# Patient Record
Sex: FEMALE | Race: WHITE | NOT HISPANIC OR LATINO | URBAN - METROPOLITAN AREA
[De-identification: names, ages, dates, MRNs, and addresses within clinical notes are randomized per-mention and may not be internally consistent; named-entity substitution may affect disease eponyms.]

---

## 2019-03-17 ENCOUNTER — EMERGENCY (EMERGENCY)
Facility: HOSPITAL | Age: 49
LOS: 0 days | Discharge: HOME | End: 2019-03-17
Attending: EMERGENCY MEDICINE | Admitting: EMERGENCY MEDICINE
Payer: MEDICAID

## 2019-03-17 VITALS
HEART RATE: 87 BPM | RESPIRATION RATE: 15 BRPM | TEMPERATURE: 98 F | SYSTOLIC BLOOD PRESSURE: 145 MMHG | OXYGEN SATURATION: 100 % | DIASTOLIC BLOOD PRESSURE: 97 MMHG | WEIGHT: 235.01 LBS | HEIGHT: 68 IN

## 2019-03-17 VITALS
HEART RATE: 85 BPM | TEMPERATURE: 98 F | SYSTOLIC BLOOD PRESSURE: 132 MMHG | OXYGEN SATURATION: 100 % | DIASTOLIC BLOOD PRESSURE: 63 MMHG | RESPIRATION RATE: 18 BRPM

## 2019-03-17 DIAGNOSIS — Z88.0 ALLERGY STATUS TO PENICILLIN: ICD-10-CM

## 2019-03-17 DIAGNOSIS — R07.9 CHEST PAIN, UNSPECIFIED: ICD-10-CM

## 2019-03-17 DIAGNOSIS — M79.622 PAIN IN LEFT UPPER ARM: ICD-10-CM

## 2019-03-17 DIAGNOSIS — R07.89 OTHER CHEST PAIN: ICD-10-CM

## 2019-03-17 DIAGNOSIS — Z88.2 ALLERGY STATUS TO SULFONAMIDES: ICD-10-CM

## 2019-03-17 DIAGNOSIS — I10 ESSENTIAL (PRIMARY) HYPERTENSION: ICD-10-CM

## 2019-03-17 LAB
ALBUMIN SERPL ELPH-MCNC: 3.4 G/DL — SIGNIFICANT CHANGE UP (ref 3.3–5)
ALP SERPL-CCNC: 65 U/L — SIGNIFICANT CHANGE UP (ref 40–120)
ALT FLD-CCNC: 21 U/L — SIGNIFICANT CHANGE UP (ref 12–78)
ANION GAP SERPL CALC-SCNC: 12 MMOL/L — SIGNIFICANT CHANGE UP (ref 5–17)
APPEARANCE UR: CLEAR — SIGNIFICANT CHANGE UP
APTT BLD: 33.2 SEC — SIGNIFICANT CHANGE UP (ref 27.5–36.3)
AST SERPL-CCNC: 14 U/L — LOW (ref 15–37)
BACTERIA # UR AUTO: ABNORMAL
BASOPHILS # BLD AUTO: 0.05 K/UL — SIGNIFICANT CHANGE UP (ref 0–0.2)
BASOPHILS NFR BLD AUTO: 0.5 % — SIGNIFICANT CHANGE UP (ref 0–2)
BILIRUB SERPL-MCNC: 0.2 MG/DL — SIGNIFICANT CHANGE UP (ref 0.2–1.2)
BILIRUB UR-MCNC: NEGATIVE — SIGNIFICANT CHANGE UP
BUN SERPL-MCNC: 9 MG/DL — SIGNIFICANT CHANGE UP (ref 7–23)
CALCIUM SERPL-MCNC: 8.5 MG/DL — SIGNIFICANT CHANGE UP (ref 8.5–10.1)
CHLORIDE SERPL-SCNC: 108 MMOL/L — SIGNIFICANT CHANGE UP (ref 96–108)
CO2 SERPL-SCNC: 23 MMOL/L — SIGNIFICANT CHANGE UP (ref 22–31)
COLOR SPEC: YELLOW — SIGNIFICANT CHANGE UP
CREAT SERPL-MCNC: 0.73 MG/DL — SIGNIFICANT CHANGE UP (ref 0.5–1.3)
D DIMER BLD IA.RAPID-MCNC: 164 NG/ML DDU — SIGNIFICANT CHANGE UP
DIFF PNL FLD: ABNORMAL
EOSINOPHIL # BLD AUTO: 0.21 K/UL — SIGNIFICANT CHANGE UP (ref 0–0.5)
EOSINOPHIL NFR BLD AUTO: 1.9 % — SIGNIFICANT CHANGE UP (ref 0–6)
EPI CELLS # UR: ABNORMAL
GLUCOSE SERPL-MCNC: 89 MG/DL — SIGNIFICANT CHANGE UP (ref 70–99)
GLUCOSE UR QL: NEGATIVE MG/DL — SIGNIFICANT CHANGE UP
HCT VFR BLD CALC: 38.8 % — SIGNIFICANT CHANGE UP (ref 34.5–45)
HGB BLD-MCNC: 11.9 G/DL — SIGNIFICANT CHANGE UP (ref 11.5–15.5)
IMM GRANULOCYTES NFR BLD AUTO: 0.3 % — SIGNIFICANT CHANGE UP (ref 0–1.5)
INR BLD: 1.05 RATIO — SIGNIFICANT CHANGE UP (ref 0.88–1.16)
KETONES UR-MCNC: NEGATIVE — SIGNIFICANT CHANGE UP
LEUKOCYTE ESTERASE UR-ACNC: NEGATIVE — SIGNIFICANT CHANGE UP
LYMPHOCYTES # BLD AUTO: 28.6 % — SIGNIFICANT CHANGE UP (ref 13–44)
LYMPHOCYTES # BLD AUTO: 3.12 K/UL — SIGNIFICANT CHANGE UP (ref 1–3.3)
MAGNESIUM SERPL-MCNC: 2 MG/DL — SIGNIFICANT CHANGE UP (ref 1.6–2.6)
MCHC RBC-ENTMCNC: 23 PG — LOW (ref 27–34)
MCHC RBC-ENTMCNC: 30.7 GM/DL — LOW (ref 32–36)
MCV RBC AUTO: 74.9 FL — LOW (ref 80–100)
MONOCYTES # BLD AUTO: 0.62 K/UL — SIGNIFICANT CHANGE UP (ref 0–0.9)
MONOCYTES NFR BLD AUTO: 5.7 % — SIGNIFICANT CHANGE UP (ref 2–14)
NEUTROPHILS # BLD AUTO: 6.89 K/UL — SIGNIFICANT CHANGE UP (ref 1.8–7.4)
NEUTROPHILS NFR BLD AUTO: 63 % — SIGNIFICANT CHANGE UP (ref 43–77)
NITRITE UR-MCNC: NEGATIVE — SIGNIFICANT CHANGE UP
NRBC # BLD: 0 /100 WBCS — SIGNIFICANT CHANGE UP (ref 0–0)
NT-PROBNP SERPL-SCNC: 39 PG/ML — SIGNIFICANT CHANGE UP (ref 0–125)
PH UR: 7 — SIGNIFICANT CHANGE UP (ref 5–8)
PLATELET # BLD AUTO: 439 K/UL — HIGH (ref 150–400)
POTASSIUM SERPL-MCNC: 3.5 MMOL/L — SIGNIFICANT CHANGE UP (ref 3.5–5.3)
POTASSIUM SERPL-SCNC: 3.5 MMOL/L — SIGNIFICANT CHANGE UP (ref 3.5–5.3)
PROT SERPL-MCNC: 7.8 GM/DL — SIGNIFICANT CHANGE UP (ref 6–8.3)
PROT UR-MCNC: NEGATIVE MG/DL — SIGNIFICANT CHANGE UP
PROTHROM AB SERPL-ACNC: 11.7 SEC — SIGNIFICANT CHANGE UP (ref 10–12.9)
RBC # BLD: 5.18 M/UL — SIGNIFICANT CHANGE UP (ref 3.8–5.2)
RBC # FLD: 17.7 % — HIGH (ref 10.3–14.5)
RBC CASTS # UR COMP ASSIST: ABNORMAL /HPF (ref 0–4)
SODIUM SERPL-SCNC: 143 MMOL/L — SIGNIFICANT CHANGE UP (ref 135–145)
SP GR SPEC: 1 — LOW (ref 1.01–1.02)
TROPONIN I SERPL-MCNC: <0.015 NG/ML — SIGNIFICANT CHANGE UP (ref 0.01–0.04)
TROPONIN I SERPL-MCNC: <0.015 NG/ML — SIGNIFICANT CHANGE UP (ref 0.01–0.04)
UROBILINOGEN FLD QL: NEGATIVE MG/DL — SIGNIFICANT CHANGE UP
WBC # BLD: 10.92 K/UL — HIGH (ref 3.8–10.5)
WBC # FLD AUTO: 10.92 K/UL — HIGH (ref 3.8–10.5)
WBC UR QL: SIGNIFICANT CHANGE UP

## 2019-03-17 PROCEDURE — 93010 ELECTROCARDIOGRAM REPORT: CPT

## 2019-03-17 PROCEDURE — 99285 EMERGENCY DEPT VISIT HI MDM: CPT | Mod: 25

## 2019-03-17 PROCEDURE — 71045 X-RAY EXAM CHEST 1 VIEW: CPT | Mod: 26

## 2019-03-17 NOTE — ED PROVIDER NOTE - CARE PROVIDER_API CALL
Lupe Treviño)  Internal Medicine  [address] [place] [address]  Phone: [phone]  Fax: [phone]  Follow Up Time:     Viraj Pascual (DO)  Cardiology; Internal Medicine  [address] [place] [address]  Phone: [phone]  Fax: [phone]  Follow Up Time:     Marie Robles)  Internal Medicine  [address] [place] [address] [address] [place] [address]  Phone: [phone]  Fax: [phone]  Follow Up Time:

## 2019-03-17 NOTE — ED PROVIDER NOTE - CLINICAL SUMMARY MEDICAL DECISION MAKING FREE TEXT BOX
LUE pain/numbness, more likely radiculopathy, but with occasional chest pressure and h/o HTN, will check labs, including trop x 2; ekg wnl.

## 2019-03-17 NOTE — ED PROVIDER NOTE - MUSCULOSKELETAL, MLM
Spine appears normal, range of motion is not limited, Diffuse tenderness across upper back/neck without point tenderness, FROM

## 2019-03-17 NOTE — ED PROVIDER NOTE - PROVIDER TOKENS
PROVIDER:[TOKEN:[[id number]]],PROVIDER:[TOKEN:[[id number]]],PROVIDER:[TOKEN:[[id number]]]

## 2019-03-17 NOTE — ED PROVIDER NOTE - NSFOLLOWUPINSTRUCTIONS_ED_ALL_ED_FT
Follow up with your doctor--see suggestions for Primary Medical Doctors locally  Follow up with cardiology  Return to ED for any further concerns or worsening symptoms    Chest Pain    Chest pain can be caused by many different conditions which may or may not be dangerous. Causes include heartburn, lung infections, heart attack, blood clot in lungs, skin infections, strain or damage to muscle, cartilage, or bones, etc. In addition to a history and physical examination, an electrocardiogram (ECG) or other lab tests may have been performed to determine the cause of your chest pain. Follow up with your primary care provider or with a cardiologist as instructed.     SEEK IMMEDIATE MEDICAL CARE IF YOU HAVE ANY OF THE FOLLOWING SYMPTOMS: worsening chest pain, coughing up blood, unexplained back/neck/jaw pain, severe abdominal pain, dizziness or lightheadedness, fainting, shortness of breath, sweaty or clammy skin, vomiting, or racing heart beat. These symptoms may represent a serious problem that is an emergency. Do not wait to see if the symptoms will go away. Get medical help right away. Call 911 and do not drive yourself to the hospital.

## 2019-03-17 NOTE — ED ADULT NURSE NOTE - OBJECTIVE STATEMENT
BIBA c/o chest pain with numbness & tingling radiating to left UE worsening tonight but intermittent for a few days. No distress noted. Able to speak in full sentences.  Denies SOB. Denies N/V. Reports she came almost hear her heart beating in left ear. Was tx from recent Ear infection & completed course of ABX BIBA c/o chest pain with numbness & tingling radiating to left UE worsening tonight but intermittent for a few days. No distress noted. Able to speak in full sentences.  Denies SOB. Denies N/V. Reports she can almost hear her heart beating in left ear. Was tx from recent ear infection & completed course of ABX. Denies heavy lifting/pulling

## 2019-03-17 NOTE — ED PROVIDER NOTE - OBJECTIVE STATEMENT
49 yo female with HTN , on amlodipine and HCTZ c/o left arm pain.  Patient c/o 2-3 days of intermittent heaviness and pain in the LUE and left upper back/neck with numbness in the hand.  Symptoms were improving with holding the arm above her head.  Today the symptoms have been constant and not relieved with position change.  Took no medication.  Patient complains of occasionally feeling a heaviness in the center of the chest.  Also c/o feeling like her heart is pounding in her ear at times.  Nonsmoker, nondrinker.  Pt lives in [place], but has been staying in [place] with her mother who now needs full time care.  +increased stressor.

## 2019-09-30 ENCOUNTER — EMERGENCY (EMERGENCY)
Facility: HOSPITAL | Age: 49
LOS: 0 days | Discharge: HOME | End: 2019-09-30
Attending: EMERGENCY MEDICINE
Payer: MEDICAID

## 2019-09-30 VITALS
TEMPERATURE: 98 F | SYSTOLIC BLOOD PRESSURE: 112 MMHG | DIASTOLIC BLOOD PRESSURE: 82 MMHG | RESPIRATION RATE: 17 BRPM | HEART RATE: 78 BPM | OXYGEN SATURATION: 100 %

## 2019-09-30 VITALS — WEIGHT: 199.96 LBS | HEIGHT: 67 IN

## 2019-09-30 DIAGNOSIS — Z88.0 ALLERGY STATUS TO PENICILLIN: ICD-10-CM

## 2019-09-30 DIAGNOSIS — Z88.2 ALLERGY STATUS TO SULFONAMIDES: ICD-10-CM

## 2019-09-30 DIAGNOSIS — M25.562 PAIN IN LEFT KNEE: ICD-10-CM
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
## 2019-09-30 DIAGNOSIS — I10 ESSENTIAL (PRIMARY) HYPERTENSION: ICD-10-CM
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
## 2019-09-30 PROCEDURE — 93010 ELECTROCARDIOGRAM REPORT: CPT

## 2019-09-30 PROCEDURE — 99283 EMERGENCY DEPT VISIT LOW MDM: CPT | Mod: 25

## 2019-09-30 PROCEDURE — 99283 EMERGENCY DEPT VISIT LOW MDM: CPT

## 2019-09-30 PROCEDURE — 73564 X-RAY EXAM KNEE 4 OR MORE: CPT | Mod: 26,LT

## 2019-09-30 PROCEDURE — 93005 ELECTROCARDIOGRAM TRACING: CPT

## 2019-09-30 PROCEDURE — 73564 X-RAY EXAM KNEE 4 OR MORE: CPT | Mod: LT

## 2019-09-30 RX ORDER — PANTOPRAZOLE SODIUM 20 MG/1
1 TABLET, DELAYED RELEASE ORAL
Qty: 20 | Refills: 0
Start: 2019-09-30

## 2019-09-30 NOTE — ED ADULT TRIAGE NOTE - CHIEF COMPLAINT QUOTE
Patient presents with left knee pain and stiffness for the past two months states she started a new workout and thought she may have overworked herself so she stopped working out but pain persisted, patient also reports left shoulder and arm tightness for the past 3 days.

## 2019-09-30 NOTE — ED STATDOCS - PATIENT PORTAL LINK FT
You can access the FollowMyHealth Patient Portal offered by [facility] by registering at the following website: [email or web address]. By joining [facility]’s FollowMyHealth portal, you will also be able to view your health information using other applications (apps) compatible with our system.

## 2019-09-30 NOTE — ED STATDOCS - OBJECTIVE STATEMENT
50 y/o female with a PMHx of HTN presents to the ED c/o left knee pain. Pt was working out 2 months ago and did leg workouts everyday for two weeks and saw her legs were swollen and her left knee was in pain. Pt stopped working out to rest her legs. Pt states she wasn't able to walk because of the pain but when she stopped taking the amlodipine which she had been taking for a while and found her swelling went down a little. Pt hasn't seen an orthopedist. Pt was taking Motrin and Tylenol that didn't seem like it helped. PCP: Dr. Flakito Loco

## 2019-09-30 NOTE — ED STATDOCS - PHYSICAL EXAMINATION
GEN: AOX3, NAD. HEENT: Throat clear. Airway is patent. EYES: PERRLA. EOMI. Head: NC/AT. NECK: Supple, No JVD. FROM. C-spine non-tender. CV:S1S2, RRR, LUNGS: CTA b/l, no w/r/r. CHEST: Equal chest expansion and rise. No deformity. ABD: Soft, NT/ND, no rebound, no guarding. No CVAT. EXT: No e/c/c. 2+ distal pulses. SKIN: No rashes. LEFT KNEE: +Mild STS left knee, ?slight joint effusion. +Tenderness left lateral knee over LCL area. No true ligament laxity. Able to straight raise LLE without difficulty. No calf tenderness. NEG Gwendolyn. NEG Wick. 2+ distal pulses. NVI. Able to fully bear weight. NEURO: No focal deficits. CN II-XII intact. FROM. 5/5 motor and sensory. ADAMARIS Gannon

## 2019-09-30 NOTE — ED ADULT NURSE NOTE - OBJECTIVE STATEMENT
Patient presents to ED complaining of knee pain. Patient complaining of L knee pain and stiffness x 2 months. Patient states she began new leg workouts and noticed swelling and pain. Patient states she stopped workouts but is still experiencing stiffness and pain mainly in L knee. Patient ambulatory on arrival. Patient states she has been taking tylenol and motrin with minimal relief.

## 2019-09-30 NOTE — ED STATDOCS - MUSCULOSKELETAL, MLM
range of motion is not limited and there is no muscle tenderness. +left supra patella edema without erythema or warmth, full ROM, no lacticity

## 2019-09-30 NOTE — ED STATDOCS - CARE PROVIDER_API CALL
Po Randhawa)  Orthopaedic Surgery  [address], [address]  [place] [address]  Phone: [phone]  Fax: [phone]  Follow Up Time:

## 2019-09-30 NOTE — ED ADULT NURSE NOTE - NSIMPLEMENTINTERV_GEN_ALL_ED
Implemented All Universal Safety Interventions:  [place] to call system. Call bell, personal items and telephone within reach. Instruct patient to call for assistance. Room bathroom lighting operational. Non-slip footwear when patient is off stretcher. Physically safe environment: no spills, clutter or unnecessary equipment. Stretcher in lowest position, wheels locked, appropriate side rails in place.

## 2019-09-30 NOTE — ED STATDOCS - PROGRESS NOTE DETAILS
Patient presented with left knee pain, likely has LCL injury or IT band syndrome. Patient re-examined and re-evaluated. Patient feels better at this time. ED evaluation, Diagnosis and management discussed with the patient in detail. Workup results discussed with KIRAN Pretty to dc home with Naproxen.  Close ORTHO follow up/MRI left knee encouraged.  Strict ED return instructions discussed in detail and patient given the opportunity to ask any questions about their discharge diagnosis and instructions. Patient verbalized understanding. ~ ADAMARIS Gannon

## 2020-02-26 ENCOUNTER — TRANSCRIPTION ENCOUNTER (OUTPATIENT)
Age: 50
End: 2020-02-26

## 2020-03-04 NOTE — ED ADULT NURSE NOTE - NS ED NURSE RECORD ANOTHER HT AND WT
Yes
Patient initially seen and evaluated with ED attending at intake.  Nontoxic appearing, happy, alert.  Flu test is negative.  REviewed symptom management at home and follow up at the pediatrician's office -Ranjit Botello PA-C

## 2020-03-10 PROBLEM — Z00.00 ENCOUNTER FOR PREVENTIVE HEALTH EXAMINATION: Status: ACTIVE | Noted: 2020-03-10

## 2020-03-11 ENCOUNTER — APPOINTMENT (OUTPATIENT)
Dept: OBGYN | Facility: CLINIC | Age: 50
End: 2020-03-11
Payer: MEDICAID

## 2020-03-11 VITALS — SYSTOLIC BLOOD PRESSURE: 110 MMHG | DIASTOLIC BLOOD PRESSURE: 70 MMHG

## 2020-03-11 DIAGNOSIS — N95.1 MENOPAUSAL AND FEMALE CLIMACTERIC STATES: ICD-10-CM

## 2020-03-11 DIAGNOSIS — N92.6 IRREGULAR MENSTRUATION, UNSPECIFIED: ICD-10-CM

## 2020-03-11 LAB
BILIRUB UR QL STRIP: NORMAL
CLARITY UR: CLEAR
COLLECTION METHOD: NORMAL
GLUCOSE UR-MCNC: NORMAL
HCG UR QL: 0.2 EU/DL
HGB UR QL STRIP.AUTO: NORMAL
KETONES UR-MCNC: NORMAL
LEUKOCYTE ESTERASE UR QL STRIP: NORMAL
NITRITE UR QL STRIP: NORMAL
PH UR STRIP: 6
PROT UR STRIP-MCNC: NORMAL
SP GR UR STRIP: 1.02

## 2020-03-11 PROCEDURE — 99204 OFFICE O/P NEW MOD 45 MIN: CPT

## 2020-03-11 PROCEDURE — 81003 URINALYSIS AUTO W/O SCOPE: CPT | Mod: QW

## 2020-03-11 NOTE — CHIEF COMPLAINT
[Initial Visit] : initial GYN visit [FreeTextEntry1] : Patient is a 49-year-old female presents for consultation to discuss previous test results and possible treatment options. Patient has a known fibroid uterus. Patient underwent a workup and evaluation in April of 2019. This included pelvic ultrasound and pelvic MRI as well small lab work. Model lab work was within normal limits pelvic M. MRI and pelvic sonogram revealed an enlarged fibroid uterus with multiple fibroids the largest of which is 6.1 cm and a 5.7 cm submucosal myoma as well and numerous additional smaller fibroids. Patient states that she was having heavy uterine bleeding at that time. Patient states that her periods have become somewhat irregular and has not been heavy a little S4 to 6 months. Patient did have an endometrial biopsy at that time which revealed benign endometrium. Discussed these results and patient's current symptoms. Advised followup pelvic ultrasound at home lab evaluation the patient also advised to schedule routine annual exam and Pap smear. Further treatment discussion will be held after the results are available. Patient states that she understands her questions had been answered and agrees with the recommended course of management.Patient also with complaints of mild occasional urinary stress incontinence. This was discussed with the patient in detail. Advised kegel exercises and weight loss.\par \par 45 minutes of face time

## 2020-03-11 NOTE — PHYSICAL EXAM
[Normal] : uterus [No Bleeding] : there was no active vaginal bleeding [Enlarged ___ wks] : enlarged [unfilled] ~Uweeks [Uterine Adnexae] : were not tender and not enlarged [FreeTextEntry7] : 2-14 week size irregular fibroid uterus noted

## 2020-03-16 ENCOUNTER — OUTPATIENT (OUTPATIENT)
Dept: OUTPATIENT SERVICES | Facility: HOSPITAL | Age: 50
LOS: 1 days | End: 2020-03-16
Payer: MEDICAID

## 2020-03-16 ENCOUNTER — APPOINTMENT (OUTPATIENT)
Dept: ULTRASOUND IMAGING | Facility: CLINIC | Age: 50
End: 2020-03-16
Payer: MEDICAID

## 2020-03-16 ENCOUNTER — RESULT REVIEW (OUTPATIENT)
Age: 50
End: 2020-03-16

## 2020-03-16 DIAGNOSIS — Z00.8 ENCOUNTER FOR OTHER GENERAL EXAMINATION: ICD-10-CM

## 2020-03-16 PROCEDURE — 76830 TRANSVAGINAL US NON-OB: CPT

## 2020-03-16 PROCEDURE — 76856 US EXAM PELVIC COMPLETE: CPT

## 2020-03-16 PROCEDURE — 76856 US EXAM PELVIC COMPLETE: CPT | Mod: 26

## 2020-03-16 PROCEDURE — 76830 TRANSVAGINAL US NON-OB: CPT | Mod: 26

## 2020-03-17 RX ORDER — HYDROCHLOROTHIAZIDE 12.5 MG/1
12.5 TABLET ORAL
Refills: 0 | Status: ACTIVE | COMMUNITY

## 2020-03-18 LAB
BASOPHILS # BLD AUTO: 0.07 K/UL
BASOPHILS NFR BLD AUTO: 0.8 %
EOSINOPHIL # BLD AUTO: 0.21 K/UL
EOSINOPHIL NFR BLD AUTO: 2.4 %
ESTRADIOL SERPL-MCNC: 94 PG/ML
FSH SERPL-MCNC: 25.2 IU/L
HCG SERPL-MCNC: <1 MIU/ML
HCT VFR BLD CALC: 42.5 %
HGB BLD-MCNC: 12.6 G/DL
IMM GRANULOCYTES NFR BLD AUTO: 0.3 %
LYMPHOCYTES # BLD AUTO: 2.63 K/UL
LYMPHOCYTES NFR BLD AUTO: 29.8 %
MAN DIFF?: NORMAL
MCHC RBC-ENTMCNC: 23.2 PG
MCHC RBC-ENTMCNC: 29.6 GM/DL
MCV RBC AUTO: 78.3 FL
MONOCYTES # BLD AUTO: 0.67 K/UL
MONOCYTES NFR BLD AUTO: 7.6 %
NEUTROPHILS # BLD AUTO: 5.21 K/UL
NEUTROPHILS NFR BLD AUTO: 59.1 %
PLATELET # BLD AUTO: 432 K/UL
RBC # BLD: 5.43 M/UL
RBC # FLD: 15.8 %
TSH SERPL-ACNC: 1.77 UIU/ML
WBC # FLD AUTO: 8.82 K/UL

## 2020-03-21 ENCOUNTER — TRANSCRIPTION ENCOUNTER (OUTPATIENT)
Age: 50
End: 2020-03-21

## 2020-04-01 ENCOUNTER — APPOINTMENT (OUTPATIENT)
Dept: GASTROENTEROLOGY | Facility: CLINIC | Age: 50
End: 2020-04-01

## 2020-04-14 ENCOUNTER — TRANSCRIPTION ENCOUNTER (OUTPATIENT)
Age: 50
End: 2020-04-14

## 2020-04-15 ENCOUNTER — TRANSCRIPTION ENCOUNTER (OUTPATIENT)
Age: 50
End: 2020-04-15

## 2020-04-16 ENCOUNTER — TRANSCRIPTION ENCOUNTER (OUTPATIENT)
Age: 50
End: 2020-04-16

## 2020-04-20 ENCOUNTER — APPOINTMENT (OUTPATIENT)
Dept: NEUROLOGY | Facility: CLINIC | Age: 50
End: 2020-04-20
Payer: MEDICAID

## 2020-04-20 VITALS — WEIGHT: 225 LBS | HEIGHT: 67.5 IN | BODY MASS INDEX: 34.9 KG/M2

## 2020-04-20 DIAGNOSIS — Z78.9 OTHER SPECIFIED HEALTH STATUS: ICD-10-CM

## 2020-04-20 DIAGNOSIS — Z82.49 FAMILY HISTORY OF ISCHEMIC HEART DISEASE AND OTHER DISEASES OF THE CIRCULATORY SYSTEM: ICD-10-CM

## 2020-04-20 DIAGNOSIS — Z81.8 FAMILY HISTORY OF OTHER MENTAL AND BEHAVIORAL DISORDERS: ICD-10-CM

## 2020-04-20 DIAGNOSIS — Z87.59 PERSONAL HISTORY OF OTHER COMPLICATIONS OF PREGNANCY, CHILDBIRTH AND THE PUERPERIUM: ICD-10-CM

## 2020-04-20 DIAGNOSIS — I10 ESSENTIAL (PRIMARY) HYPERTENSION: ICD-10-CM

## 2020-04-20 DIAGNOSIS — M54.2 CERVICALGIA: ICD-10-CM

## 2020-04-20 DIAGNOSIS — R51 HEADACHE: ICD-10-CM

## 2020-04-20 PROCEDURE — 99204 OFFICE O/P NEW MOD 45 MIN: CPT | Mod: 95

## 2020-04-20 RX ORDER — CYCLOBENZAPRINE HYDROCHLORIDE 10 MG/1
10 TABLET, FILM COATED ORAL
Qty: 30 | Refills: 1 | Status: ACTIVE | COMMUNITY
Start: 2020-04-20 | End: 1900-01-01

## 2020-04-20 NOTE — CONSULT LETTER
[Dear  ___] : Dear ~SKIP, [Consult Closing:] : Thank you very much for allowing me to participate in the care of this patient.  If you have any questions, please do not hesitate to contact me. [Please see my note below.] : Please see my note below. [Consult Letter:] : I had the pleasure of evaluating your patient, [unfilled]. [FreeTextEntry2] : Danielle Kaplan [FreeTextEntry3] : Sincerely,\par \par \par Hodan Cunningham MD\par Diplomate, American Academy of Psychiatry and Neurology\par Board Certified in the Subspecialty of Clinical Neurophysiology\par Board Certified in the Subspecialty of Sleep Medicine\par Board Certified in the Subspecialty of Epilepsy\par

## 2020-04-20 NOTE — DISCUSSION/SUMMARY
[FreeTextEntry1] : Ms. Foster is a 49 year old woman with daily headaches for two weeks.\par The pain is severe and is focused on the right sided and is associated with neck pain.\par She has some features of both migraine and tension headache.\par She is perimenopausal and hormonal issues may also be a contributing factor. \par \par Limited exam via televisit is unremarkable.\par \par -Will check MRI brain with and without contrast to r/o any significant pathology given unilateral nature of headache. She would like to have this done at a stand up facility.\par -f/u COVID-19 swab results. I told her that if she is positive for covid-19, we may have to delay MRI.\par -Trial of cyclobenzaprine 10 mg qhs for 1 week and then qhs as needed to help relieve muscle tension that is likely contributing to headache.\par -If headache persists and she is covid negative, we can consider a short course of steroids to break headache. \par -I am holding off on a daily preventative medication since these may take weeks to work and may be somewhat premature at this time. However, if headaches persist, can also try a tricyclic antidepressant or Topamax.\par \par Will f/u with patient after MRI and with either virtual or in person visit in about one month. \par \par

## 2020-04-20 NOTE — HISTORY OF PRESENT ILLNESS
[Medical Office: ([facility])___] : at the medical office located in  [Patient] : the patient [Home] : at home, [unfilled] , at the time of the visit. [Self] : self [FreeTextEntry1] : \par This is a telehealth visit that was performed with the originating site at the patient’s home and the distant site of my office at [address] [place] [address].\par Two way audio and visual technology was used. \par Verbal consent to participate in the telephone/video visit was obtained in lieu of in-person signature due to the coronavirus emergency.\par This particular visit occurred during the 2020 COVID-19 emergency. I discussed with the patient the nature of our telehealth visits, that:\par -I would evaluate the patient and recommend diagnostics and treatments based on my assessment.\par -Our sessions are not being recorded.\par -The patient acknowledged the risk of unsecure transmission of his/her information.\par -Our team would provide follow up care in person if/when the patient needs it.\par \par She was referred to neurology by urgent care. \par She went to urgent care for headaches.\par She has been experiencing headaches for about two weeks. She does not typically suffer from headaches.\par The headaches are located on the right side of her head. \par She has tenderness to the touch over the right frontal and right parietal area. There is sometimes radiation to the right temple. At times it feels like she has an ear infection. At other times there is pain in her jaw.\par She also has pain in neck.\par The pain comes and goes. Yesterday was a good day. However, it returned today.\par She describes the pain as a throbbing and burning sensation.\par She also has some soreness on the left.\par She has associated dizziness/disequilibrium. She also has photophobia, predominantly in the right eye and some phonophobia. The headache is not associated with nausea.\par \par She has tried taking Tylenol without relief. \par At urgent care she was given a Toradol injection. It did not provide relief. It caused nausea.\par \par She denies having any fever. \par She denies having any focal weakness. \par She feels very uncomfortable and is having difficulty focusing.\par \par She says that she was previously prescribed antidepressants so she tried to take it again for two days but it did not provide relief. \par She did recently have pink eye for which she was prescribed antibiotics but otherwise she has not had any recent illnesses.\par \par She had a swab for covid-19 on 4/18 and is waiting for the results.

## 2020-04-20 NOTE — REVIEW OF SYSTEMS
[Eyesight Problems] : eyesight problems [Lower Ext Edema] : lower extremity edema [As Noted in HPI] : as noted in HPI [Cough] : cough [Negative] : Gastrointestinal [de-identified] : stress [de-identified] : headache\par neck pain [FreeTextEntry9] : neck pain [FreeTextEntry8] : irregular menstrual cycle [FreeTextEntry4] : earache

## 2020-04-20 NOTE — PHYSICAL EXAM
[FreeTextEntry1] : Examination:\par Patient is well appearing\par Neurological Examination:\par Mental Status: Alert, oriented to person, place and time\par Language: No aphasia\par Cranial Nerves:\par PERRL, EOMI, No facial weakness, tongue protrudes in the midline\par Motor Exam: No pronator drift. Barrel roll intact. Fine motor movements intact in hands\par Able to stand up from chair without difficulty\par Sensory: intact to self exam\par Reflexes: Unable to examine\par Cerebellar: Finger to nose intact bilaterally\par \par \par

## 2020-04-23 ENCOUNTER — APPOINTMENT (OUTPATIENT)
Dept: MRI IMAGING | Facility: CLINIC | Age: 50
End: 2020-04-23
Payer: MEDICAID

## 2020-04-23 ENCOUNTER — OUTPATIENT (OUTPATIENT)
Dept: OUTPATIENT SERVICES | Facility: HOSPITAL | Age: 50
LOS: 1 days | End: 2020-04-23
Payer: MEDICAID

## 2020-04-23 ENCOUNTER — RESULT REVIEW (OUTPATIENT)
Age: 50
End: 2020-04-23

## 2020-04-23 ENCOUNTER — TRANSCRIPTION ENCOUNTER (OUTPATIENT)
Age: 50
End: 2020-04-23

## 2020-04-23 DIAGNOSIS — R51 HEADACHE: ICD-10-CM

## 2020-04-23 PROCEDURE — 70553 MRI BRAIN STEM W/O & W/DYE: CPT

## 2020-04-23 PROCEDURE — 70553 MRI BRAIN STEM W/O & W/DYE: CPT | Mod: 26

## 2020-04-23 PROCEDURE — A9585: CPT

## 2020-04-28 ENCOUNTER — TRANSCRIPTION ENCOUNTER (OUTPATIENT)
Age: 50
End: 2020-04-28

## 2020-05-01 ENCOUNTER — APPOINTMENT (OUTPATIENT)
Dept: OBGYN | Facility: CLINIC | Age: 50
End: 2020-05-01
Payer: MEDICAID

## 2020-05-01 ENCOUNTER — APPOINTMENT (OUTPATIENT)
Dept: OBGYN | Facility: CLINIC | Age: 50
End: 2020-05-01

## 2020-05-01 DIAGNOSIS — Z86.018 PERSONAL HISTORY OF OTHER BENIGN NEOPLASM: ICD-10-CM

## 2020-05-01 PROCEDURE — 99214 OFFICE O/P EST MOD 30 MIN: CPT | Mod: 95

## 2020-05-01 RX ORDER — MISOPROSTOL 200 UG/1
200 TABLET ORAL
Qty: 2 | Refills: 0 | Status: ACTIVE | COMMUNITY
Start: 2020-05-01 | End: 1900-01-01

## 2020-05-01 NOTE — HISTORY OF PRESENT ILLNESS
[Home] : at home, [unfilled] , at the time of the visit. [Medical Office: ([facility])___] : at the medical office located in  [Patient] : the patient

## 2020-05-16 ENCOUNTER — TRANSCRIPTION ENCOUNTER (OUTPATIENT)
Age: 50
End: 2020-05-16

## 2020-05-26 ENCOUNTER — APPOINTMENT (OUTPATIENT)
Dept: OBGYN | Facility: CLINIC | Age: 50
End: 2020-05-26
Payer: MEDICAID

## 2020-05-26 DIAGNOSIS — Z86.018 PERSONAL HISTORY OF OTHER BENIGN NEOPLASM: ICD-10-CM

## 2020-05-26 DIAGNOSIS — N92.0 EXCESSIVE AND FREQUENT MENSTRUATION WITH REGULAR CYCLE: ICD-10-CM

## 2020-05-26 PROCEDURE — 58558Z: CUSTOM

## 2020-05-26 NOTE — PROCEDURE
[Irregular Bleeding] : irregular uterine bleeding [Endometrial Biopsy] : Endometrial biopsy [Risks] : risks [Abnormal US] : abnormal ultrasound findings [Benefits] : benefits [Alternatives] : alternatives [Bleeding] : bleeding [Infection] : infection [Patient] : patient [Pain] : pain [Allergic Reaction] : allergic reaction [CONSENT OBTAINED] : written consent was obtained prior to the procedure. [Uterine Perforation] : uterine perforation [Paracervical Block] : A paracervical block was performed using [Neg Pregnancy Test] : a pregnancy test was negative [Betadine] : Betadine [1%] : 1% [___ mL Injected] : [unfilled] ~UmL [Without Epi] : without epinephrine [Tenaculum] : a single toothed tenaculum [Easy Passage] : allowed easy passage of a uterine sound without dilation [Anteverted] : anteverted [Pipelle] : a Pipelle endometrial suction curette [Moderate] : a moderate [Sent to Histology] : the specimen was place in buffered formalin and sent for pathlogy [Tolerated Well] : the patient tolerated the procedure well [No Complications] : there were no complications [de-identified] : Cytotec

## 2020-05-26 NOTE — PROCEDURE
[Endometrial Biopsy] : Endometrial biopsy [Irregular Bleeding] : irregular uterine bleeding [Abnormal US] : abnormal ultrasound findings [Risks] : risks [Benefits] : benefits [Alternatives] : alternatives [Patient] : patient [Infection] : infection [Bleeding] : bleeding [Uterine Perforation] : uterine perforation [Allergic Reaction] : allergic reaction [Pain] : pain [CONSENT OBTAINED] : written consent was obtained prior to the procedure. [Paracervical Block] : A paracervical block was performed using [Betadine] : Betadine [Neg Pregnancy Test] : a pregnancy test was negative [___ mL Injected] : [unfilled] ~UmL [1%] : 1% [Easy Passage] : allowed easy passage of a uterine sound without dilation [Tenaculum] : a single toothed tenaculum [Without Epi] : without epinephrine [Moderate] : a moderate [Pipelle] : a Pipelle endometrial suction curette [Anteverted] : anteverted [Tolerated Well] : the patient tolerated the procedure well [Sent to Histology] : the specimen was place in buffered formalin and sent for pathlogy [No Complications] : there were no complications [de-identified] : Cytotec

## 2020-06-09 ENCOUNTER — APPOINTMENT (OUTPATIENT)
Dept: OBGYN | Facility: CLINIC | Age: 50
End: 2020-06-09

## 2020-06-10 ENCOUNTER — APPOINTMENT (OUTPATIENT)
Dept: OBGYN | Facility: CLINIC | Age: 50
End: 2020-06-10
Payer: MEDICAID

## 2020-06-10 PROCEDURE — 99215 OFFICE O/P EST HI 40 MIN: CPT | Mod: 95

## 2020-06-10 NOTE — HISTORY OF PRESENT ILLNESS
[Home] : at home, [unfilled] , at the time of the visit. [Medical Office: ([facility])___] : at the medical office located in  [Verbal consent obtained from patient] : the patient, [unfilled]

## 2020-06-16 ENCOUNTER — APPOINTMENT (OUTPATIENT)
Dept: OBGYN | Facility: CLINIC | Age: 50
End: 2020-06-16
Payer: MEDICAID

## 2020-06-16 VITALS
DIASTOLIC BLOOD PRESSURE: 62 MMHG | HEIGHT: 67.5 IN | BODY MASS INDEX: 34.9 KG/M2 | SYSTOLIC BLOOD PRESSURE: 118 MMHG | WEIGHT: 225 LBS

## 2020-06-16 DIAGNOSIS — N93.9 ABNORMAL UTERINE AND VAGINAL BLEEDING, UNSPECIFIED: ICD-10-CM

## 2020-06-16 DIAGNOSIS — D25.9 LEIOMYOMA OF UTERUS, UNSPECIFIED: ICD-10-CM

## 2020-06-16 DIAGNOSIS — Z01.419 ENCOUNTER FOR GYNECOLOGICAL EXAMINATION (GENERAL) (ROUTINE) W/OUT ABNORMAL FINDINGS: ICD-10-CM

## 2020-06-16 DIAGNOSIS — Z86.018 PERSONAL HISTORY OF OTHER BENIGN NEOPLASM: ICD-10-CM

## 2020-06-16 DIAGNOSIS — Z87.42 PERSONAL HISTORY OF OTHER DISEASES OF THE FEMALE GENITAL TRACT: ICD-10-CM

## 2020-06-16 PROCEDURE — 99396 PREV VISIT EST AGE 40-64: CPT

## 2020-06-16 PROCEDURE — 82270 OCCULT BLOOD FECES: CPT

## 2020-06-16 NOTE — PHYSICAL EXAM
[Awake] : awake [Acute Distress] : no acute distress [Alert] : alert [Mass] : no breast mass [Axillary LAD] : no axillary lymphadenopathy [Nipple Discharge] : no nipple discharge [Tender] : non tender [Oriented x3] : oriented to person, place, and time [Soft] : soft [No Bleeding] : there was no active vaginal bleeding [Pap Obtained] : a Pap smear was performed [Normal] : uterus [Enlarged ___ wks] : enlarged [unfilled] ~Uweeks [No Tenderness] : no rectal tenderness [Uterine Adnexae] : were not tender and not enlarged [Occult Blood] : occult blood test from digital rectal exam was negative [Nl Sphincter Tone] : normal sphincter tone

## 2020-06-17 ENCOUNTER — APPOINTMENT (OUTPATIENT)
Dept: OTOLARYNGOLOGY | Facility: CLINIC | Age: 50
End: 2020-06-17
Payer: MEDICAID

## 2020-06-17 VITALS
DIASTOLIC BLOOD PRESSURE: 84 MMHG | SYSTOLIC BLOOD PRESSURE: 141 MMHG | BODY MASS INDEX: 34.9 KG/M2 | WEIGHT: 225 LBS | HEIGHT: 67.5 IN | TEMPERATURE: 98 F | HEART RATE: 81 BPM

## 2020-06-17 DIAGNOSIS — R42 DIZZINESS AND GIDDINESS: ICD-10-CM

## 2020-06-17 DIAGNOSIS — H93.A1 PULSATILE TINNITUS, RIGHT EAR: ICD-10-CM

## 2020-06-17 PROCEDURE — 99204 OFFICE O/P NEW MOD 45 MIN: CPT | Mod: 25

## 2020-06-17 PROCEDURE — 92567 TYMPANOMETRY: CPT

## 2020-06-17 PROCEDURE — 92557 COMPREHENSIVE HEARING TEST: CPT

## 2020-06-17 NOTE — ASSESSMENT
[FreeTextEntry1] : Ms. MCINTOSH is a 49 year female with right pulsatile tinnitus, audio today showed normal hearing.  On diuretic.  Nl MRI \par - f/up 6 months for repeat audio to ensure nothing is missed, but normal exam today

## 2020-06-17 NOTE — HISTORY OF PRESENT ILLNESS
[de-identified] : Ms. MCINTOSH is a 49 year female with right pulsatile tinnitus, occasionally in the left for 2 -3 months \par usually when laying down or sitting in a certain position\par otherwise no obvious aggravating or alleviating factors \par no vertigo, but feels equilibrium is off at times for months but thought it may be due to diuretic \par denies otalgia, otorrhea, hearing loss. \par had ear infection last year treated with drops \par had MRI in April - negative for CPA tumor or pseudotumor \par recently started getting migraines as well - follows with neuro, feels some soreness in the right forehead as a result.

## 2020-06-17 NOTE — CONSULT LETTER
[Dear  ___] : Dear  [unfilled], [Consult Letter:] : I had the pleasure of evaluating your patient, [unfilled]. [Please see my note below.] : Please see my note below. [Consult Closing:] : Thank you very much for allowing me to participate in the care of this patient.  If you have any questions, please do not hesitate to contact me. [FreeTextEntry3] : I personally saw and examined SARAH MCINTOSH in detail.  I spoke to ELVIRA Pressley regarding the assessment and plan of care. I performed the procedures and relevant physical exam.  I have reviewed the above assessment and plan of care and I agree.  I have made changes to the body of the note wherever necessary and appropriate.

## 2020-06-18 LAB
C TRACH RRNA SPEC QL NAA+PROBE: NOT DETECTED
HPV HIGH+LOW RISK DNA PNL CVX: NOT DETECTED
N GONORRHOEA RRNA SPEC QL NAA+PROBE: NOT DETECTED
SOURCE TP AMPLIFICATION: NORMAL

## 2020-06-25 ENCOUNTER — RESULT REVIEW (OUTPATIENT)
Age: 50
End: 2020-06-25

## 2020-06-25 ENCOUNTER — APPOINTMENT (OUTPATIENT)
Dept: MAMMOGRAPHY | Facility: CLINIC | Age: 50
End: 2020-06-25
Payer: MEDICAID

## 2020-06-25 ENCOUNTER — OUTPATIENT (OUTPATIENT)
Dept: OUTPATIENT SERVICES | Facility: HOSPITAL | Age: 50
LOS: 1 days | End: 2020-06-25
Payer: MEDICAID

## 2020-06-25 DIAGNOSIS — Z00.8 ENCOUNTER FOR OTHER GENERAL EXAMINATION: ICD-10-CM

## 2020-06-25 PROCEDURE — 77063 BREAST TOMOSYNTHESIS BI: CPT

## 2020-06-25 PROCEDURE — 77063 BREAST TOMOSYNTHESIS BI: CPT | Mod: 26

## 2020-06-25 PROCEDURE — 77067 SCR MAMMO BI INCL CAD: CPT

## 2020-06-25 PROCEDURE — 77067 SCR MAMMO BI INCL CAD: CPT | Mod: 26

## 2020-06-26 ENCOUNTER — OUTPATIENT (OUTPATIENT)
Dept: OUTPATIENT SERVICES | Facility: HOSPITAL | Age: 50
LOS: 1 days | End: 2020-06-26

## 2020-06-26 ENCOUNTER — APPOINTMENT (OUTPATIENT)
Dept: NUCLEAR MEDICINE | Facility: CLINIC | Age: 50
End: 2020-06-26
Payer: MEDICAID

## 2020-06-26 DIAGNOSIS — R10.11 RIGHT UPPER QUADRANT PAIN: ICD-10-CM

## 2020-06-26 PROCEDURE — 78227 HEPATOBIL SYST IMAGE W/DRUG: CPT | Mod: 26

## 2020-10-11 ENCOUNTER — TRANSCRIPTION ENCOUNTER (OUTPATIENT)
Age: 50
End: 2020-10-11

## 2021-09-22 ENCOUNTER — TRANSCRIPTION ENCOUNTER (OUTPATIENT)
Age: 51
End: 2021-09-22

## 2022-04-22 ENCOUNTER — TRANSCRIPTION ENCOUNTER (OUTPATIENT)
Age: 52
End: 2022-04-22

## 2022-09-05 ENCOUNTER — NON-APPOINTMENT (OUTPATIENT)
Age: 52
End: 2022-09-05

## 2023-08-21 ENCOUNTER — NON-APPOINTMENT (OUTPATIENT)
Age: 53
End: 2023-08-21

## 2023-10-23 ENCOUNTER — NON-APPOINTMENT (OUTPATIENT)
Age: 53
End: 2023-10-23

## 2023-10-24 ENCOUNTER — NON-APPOINTMENT (OUTPATIENT)
Age: 53
End: 2023-10-24

## 2024-01-22 ENCOUNTER — NON-APPOINTMENT (OUTPATIENT)
Age: 54
End: 2024-01-22

## 2024-04-27 ENCOUNTER — OFFICE (OUTPATIENT)
Dept: URBAN - METROPOLITAN AREA CLINIC 38 | Facility: CLINIC | Age: 54
Setting detail: OPHTHALMOLOGY
End: 2024-04-27
Payer: COMMERCIAL

## 2024-04-27 DIAGNOSIS — H52.4: ICD-10-CM

## 2024-04-27 DIAGNOSIS — H35.372: ICD-10-CM

## 2024-04-27 DIAGNOSIS — H16.223: ICD-10-CM

## 2024-04-27 PROCEDURE — 92015 DETERMINE REFRACTIVE STATE: CPT

## 2024-04-27 PROCEDURE — 92134 CPTRZ OPH DX IMG PST SGM RTA: CPT

## 2024-04-27 PROCEDURE — 92014 COMPRE OPH EXAM EST PT 1/>: CPT

## 2024-08-30 NOTE — REVIEW OF SYSTEMS
Ayo Bueno is a 83 y.o. female on day 1 of admission presenting with Dizziness.    Subjective     Resting, no issues    Objective     Physical Exam  Constitutional:       Appearance: Normal appearance.   HENT:      Head: Normocephalic and atraumatic.      Right Ear: Tympanic membrane and ear canal normal.      Left Ear: Tympanic membrane and ear canal normal.      Mouth/Throat:      Mouth: Mucous membranes are moist.      Pharynx: Oropharynx is clear.   Eyes:      Extraocular Movements: Extraocular movements intact.      Conjunctiva/sclera: Conjunctivae normal.      Pupils: Pupils are equal, round, and reactive to light.   Cardiovascular:      Rate and Rhythm: Normal rate and regular rhythm.      Pulses: Normal pulses.      Heart sounds: Normal heart sounds.   Pulmonary:      Effort: Pulmonary effort is normal.      Breath sounds: Normal breath sounds.   Abdominal:      General: Abdomen is flat. Bowel sounds are normal.      Palpations: Abdomen is soft.   Musculoskeletal:         General: Normal range of motion.      Cervical back: Normal range of motion and neck supple.   Skin:     General: Skin is warm and dry.      Capillary Refill: Capillary refill takes 2 to 3 seconds.   Neurological:      General: No focal deficit present.      Mental Status: She is alert and oriented to person, place, and time. Mental status is at baseline.   Psychiatric:         Mood and Affect: Mood normal.         Behavior: Behavior normal.         Thought Content: Thought content normal.         Judgment: Judgment normal.         Last Recorded Vitals  Blood pressure 136/71, pulse 72, temperature 36.7 °C (98.1 °F), temperature source Temporal, resp. rate 16, height 1.524 m (5'), weight 76.3 kg (168 lb 3.4 oz), SpO2 94%.  Intake/Output last 3 Shifts:  I/O last 3 completed shifts:  In: 465.8 (6.1 mL/kg) [I.V.:415.8 (5.5 mL/kg); IV Piggyback:50]  Out: 600 (7.9 mL/kg) [Urine:600 (0.2 mL/kg/hr)]  Dosing Weight: 76.2 kg     Relevant  Results                              Assessment/Plan   Assessment & Plan  Dizziness    Ataxia    Ayo is an 83-year-old English speaking female presenting to emergency department for evaluation of a fall.  Patient states that she stood up and felt dizzy and then fell backwards hitting her head, no loss of consciousness.  No anticoagulation however she does take a baby aspirin daily.  CT of the head/C-spine negative for acute process.  Patient complaining of right great toe pain, x-rays showing a mildly displaced first phalanx fracture.  Postop shoe applied.  Patient found to have a UTI, urine cultures pending, started on IV ceftriaxone and IV fluids.  CT angio of the head and neck ordered and pending.  Mildly elevated troponin of 19 with a repeat of 19, will continue to trend.  Patient admitted to telemetry observation for further medical management.     Dizziness/acute cystitis/elevated troponin/right great toe fracture  Admit to telemetry observation per Dr. Batista  See imaging results above  CT angio head and neck pending  Telemetry monitoring  Urine cultures pending  Continue IV ceftriaxone every 24 hours  IV Zofran as needed  Tylenol as needed  Continue IV fluids  Trend troponin  PT/OT  Postop shoe ordered  Repeat labs in a.m.     GERD/hypothyroidism/depression/hypertension/CAD/aortic stenosis/hyperlipidemia  #Chronic conditions  Telemetry monitoring  Cardiac diet  Nursing to complete home med rec     DVT Ppx  SCDs  Heparin subcutaneous  Out of bed with assistance     8/28: doing well, still dizzy, carotid US ordered, MRI of brain    8/29: doing ok [facility] low meclizine needs snf    8/30: dong well cango to snf on sunday       I spent 35 minutes in the professional and overall care of this patient.      Hans Batista, DO       [Nl] : Musculoskeletal

## 2024-09-11 NOTE — ED PROVIDER NOTE - CHIEF COMPLAINT
Patient requests prescription below    Last Office Visit: 2/6/2024   Next Office Visit: 12/2/2024     Requested Prescriptions     Pending Prescriptions Disp Refills    levothyroxine (Synthroid, Levoxyl) 125 mcg tablet 90 tablet 3     Sig: Take 1 tablet (125 mcg) by mouth once daily.        The patient is a 48y Female complaining of chest pain.